# Patient Record
Sex: FEMALE | Race: WHITE | Employment: OTHER | URBAN - METROPOLITAN AREA
[De-identification: names, ages, dates, MRNs, and addresses within clinical notes are randomized per-mention and may not be internally consistent; named-entity substitution may affect disease eponyms.]

---

## 2022-08-06 ENCOUNTER — APPOINTMENT (OUTPATIENT)
Dept: GENERAL RADIOLOGY | Age: 62
End: 2022-08-06
Payer: COMMERCIAL

## 2022-08-06 ENCOUNTER — HOSPITAL ENCOUNTER (EMERGENCY)
Age: 62
Discharge: LEFT BEFORE DISCHARGE | End: 2022-08-06
Attending: STUDENT IN AN ORGANIZED HEALTH CARE EDUCATION/TRAINING PROGRAM
Payer: COMMERCIAL

## 2022-08-06 ENCOUNTER — APPOINTMENT (OUTPATIENT)
Dept: CT IMAGING | Age: 62
End: 2022-08-06
Payer: COMMERCIAL

## 2022-08-06 VITALS
RESPIRATION RATE: 20 BRPM | SYSTOLIC BLOOD PRESSURE: 140 MMHG | HEART RATE: 67 BPM | HEIGHT: 64 IN | BODY MASS INDEX: 27.31 KG/M2 | DIASTOLIC BLOOD PRESSURE: 83 MMHG | OXYGEN SATURATION: 97 % | WEIGHT: 160 LBS

## 2022-08-06 DIAGNOSIS — S42.002A CLOSED DISPLACED FRACTURE OF LEFT CLAVICLE, UNSPECIFIED PART OF CLAVICLE, INITIAL ENCOUNTER: Primary | ICD-10-CM

## 2022-08-06 LAB
ANION GAP SERPL CALCULATED.3IONS-SCNC: 10 MMOL/L (ref 4–16)
APTT: 28.3 SECONDS (ref 25.1–37.1)
BASOPHILS ABSOLUTE: 0.1 K/CU MM
BASOPHILS RELATIVE PERCENT: 0.7 % (ref 0–1)
BUN BLDV-MCNC: 20 MG/DL (ref 6–23)
CALCIUM SERPL-MCNC: 9.4 MG/DL (ref 8.3–10.6)
CHLORIDE BLD-SCNC: 107 MMOL/L (ref 99–110)
CO2: 23 MMOL/L (ref 21–32)
CREAT SERPL-MCNC: 0.8 MG/DL (ref 0.6–1.1)
DIFFERENTIAL TYPE: ABNORMAL
EOSINOPHILS ABSOLUTE: 0 K/CU MM
EOSINOPHILS RELATIVE PERCENT: 0.4 % (ref 0–3)
GFR AFRICAN AMERICAN: >60 ML/MIN/1.73M2
GFR NON-AFRICAN AMERICAN: >60 ML/MIN/1.73M2
GLUCOSE BLD-MCNC: 105 MG/DL (ref 70–99)
HCT VFR BLD CALC: 39 % (ref 37–47)
HEMOGLOBIN: 12.7 GM/DL (ref 12.5–16)
IMMATURE NEUTROPHIL %: 0.5 % (ref 0–0.43)
INR BLD: 0.92 INDEX
LYMPHOCYTES ABSOLUTE: 2.1 K/CU MM
LYMPHOCYTES RELATIVE PERCENT: 28.5 % (ref 24–44)
MCH RBC QN AUTO: 28.7 PG (ref 27–31)
MCHC RBC AUTO-ENTMCNC: 32.6 % (ref 32–36)
MCV RBC AUTO: 88 FL (ref 78–100)
MONOCYTES ABSOLUTE: 0.5 K/CU MM
MONOCYTES RELATIVE PERCENT: 7.1 % (ref 0–4)
NUCLEATED RBC %: 0 %
PDW BLD-RTO: 12.6 % (ref 11.7–14.9)
PLATELET # BLD: 239 K/CU MM (ref 140–440)
PMV BLD AUTO: 10 FL (ref 7.5–11.1)
POTASSIUM SERPL-SCNC: 4.4 MMOL/L (ref 3.5–5.1)
PROTHROMBIN TIME: 11.9 SECONDS (ref 11.7–14.5)
RBC # BLD: 4.43 M/CU MM (ref 4.2–5.4)
SEGMENTED NEUTROPHILS ABSOLUTE COUNT: 4.6 K/CU MM
SEGMENTED NEUTROPHILS RELATIVE PERCENT: 62.8 % (ref 36–66)
SODIUM BLD-SCNC: 140 MMOL/L (ref 135–145)
TOTAL IMMATURE NEUTOROPHIL: 0.04 K/CU MM
TOTAL NUCLEATED RBC: 0 K/CU MM
WBC # BLD: 7.3 K/CU MM (ref 4–10.5)

## 2022-08-06 PROCEDURE — 85025 COMPLETE CBC W/AUTO DIFF WBC: CPT

## 2022-08-06 PROCEDURE — 71045 X-RAY EXAM CHEST 1 VIEW: CPT

## 2022-08-06 PROCEDURE — 96374 THER/PROPH/DIAG INJ IV PUSH: CPT

## 2022-08-06 PROCEDURE — 85730 THROMBOPLASTIN TIME PARTIAL: CPT

## 2022-08-06 PROCEDURE — 85610 PROTHROMBIN TIME: CPT

## 2022-08-06 PROCEDURE — 72125 CT NECK SPINE W/O DYE: CPT

## 2022-08-06 PROCEDURE — 72170 X-RAY EXAM OF PELVIS: CPT

## 2022-08-06 PROCEDURE — 99285 EMERGENCY DEPT VISIT HI MDM: CPT

## 2022-08-06 PROCEDURE — 80048 BASIC METABOLIC PNL TOTAL CA: CPT

## 2022-08-06 PROCEDURE — 70450 CT HEAD/BRAIN W/O DYE: CPT

## 2022-08-06 PROCEDURE — 73030 X-RAY EXAM OF SHOULDER: CPT

## 2022-08-06 PROCEDURE — 6370000000 HC RX 637 (ALT 250 FOR IP): Performed by: STUDENT IN AN ORGANIZED HEALTH CARE EDUCATION/TRAINING PROGRAM

## 2022-08-06 PROCEDURE — 93005 ELECTROCARDIOGRAM TRACING: CPT | Performed by: STUDENT IN AN ORGANIZED HEALTH CARE EDUCATION/TRAINING PROGRAM

## 2022-08-06 PROCEDURE — 6360000002 HC RX W HCPCS: Performed by: STUDENT IN AN ORGANIZED HEALTH CARE EDUCATION/TRAINING PROGRAM

## 2022-08-06 PROCEDURE — 73564 X-RAY EXAM KNEE 4 OR MORE: CPT

## 2022-08-06 RX ORDER — MORPHINE SULFATE 4 MG/ML
4 INJECTION, SOLUTION INTRAMUSCULAR; INTRAVENOUS ONCE
Status: COMPLETED | OUTPATIENT
Start: 2022-08-06 | End: 2022-08-06

## 2022-08-06 RX ORDER — HYDROCODONE BITARTRATE AND ACETAMINOPHEN 5; 325 MG/1; MG/1
1 TABLET ORAL ONCE
Status: COMPLETED | OUTPATIENT
Start: 2022-08-06 | End: 2022-08-06

## 2022-08-06 RX ADMIN — HYDROCODONE BITARTRATE AND ACETAMINOPHEN 1 TABLET: 5; 325 TABLET ORAL at 16:58

## 2022-08-06 RX ADMIN — MORPHINE SULFATE 4 MG: 4 INJECTION, SOLUTION INTRAMUSCULAR; INTRAVENOUS at 14:11

## 2022-08-06 ASSESSMENT — PAIN - FUNCTIONAL ASSESSMENT: PAIN_FUNCTIONAL_ASSESSMENT: 0-10

## 2022-08-06 ASSESSMENT — PAIN SCALES - GENERAL
PAINLEVEL_OUTOF10: 10
PAINLEVEL_OUTOF10: 9
PAINLEVEL_OUTOF10: 8

## 2022-08-06 ASSESSMENT — PAIN DESCRIPTION - LOCATION: LOCATION: SHOULDER

## 2022-08-06 ASSESSMENT — PAIN DESCRIPTION - ORIENTATION
ORIENTATION: LEFT
ORIENTATION: LEFT

## 2022-08-06 NOTE — DISCHARGE INSTRUCTIONS
You are leaving AMA. Presents to the ED for further evaluation. You may also follow-up with the nearest ED as soon as possible for full evaluation.

## 2022-08-06 NOTE — ED PROVIDER NOTES
Emergency Department Encounter    Patient: Franca Cooper  MRN: [id number]  : 1960  Date of Evaluation: 2022  ED Provider:  Lubna Byrd DO    Triage Chief Complaint:   Fall (Off a horse and horse landed on her left arm)    [place]:  Franca Cooper is a 64 y.o. female that presents to the ED with a history of trauma. Patient fell off her horse on one of the left side of her body. Patient denies any loss of consciousness but endorses pain in the left shoulder. ROS - see HPI, below listed is current ROS at time of my eval:  General:  No fevers, no chills, no weakness  Eyes:  No recent vison changes, no discharge  ENT:  No sore throat, no nasal congestion, no hearing changes  Cardiovascular:  No chest pain, no palpitations  Respiratory:  No shortness of breath, no cough, no wheezing  Gastrointestinal:  No pain, no nausea, no vomiting, no diarrhea  Musculoskeletal:  No muscle pain, no joint pain  Skin:  No rash, no pruritis, no easy bruising  Neurologic:  No speech problems, no headache, no extremity numbness, no extremity tingling, no extremity weakness  Psychiatric:  No anxiety  Genitourinary:  No dysuria, no hematuria  Endocrine:  No unexpected weight gain, no unexpected weight loss  Extremities:  no edema, no pain    History reviewed. No pertinent past medical history. Past Surgical History:   Procedure Laterality Date     SECTION      x2    HERNIA REPAIR      TONSILLECTOMY       History reviewed. No pertinent family history.   Social History     Socioeconomic History    Marital status: Single     Spouse name: Not on file    Number of children: Not on file    Years of education: Not on file    Highest education level: Not on file   Occupational History    Not on file   Tobacco Use    Smoking status: Never     Passive exposure: Never    Smokeless tobacco: Never   Vaping Use    Vaping Use: Never used   Substance and Sexual Activity    Alcohol use: Not Currently    Drug use: Never    Sexual activity: Not Currently   Other Topics Concern    Not on file   Social History Narrative    Not on file     Social Determinants of Health     Financial Resource Strain: Not on file   Food Insecurity: Not on file   Transportation Needs: Not on file   Physical Activity: Not on file   Stress: Not on file   Social Connections: Not on file   Intimate Partner Violence: Not on file   Housing Stability: Not on file     No current facility-administered medications for this encounter. No current outpatient medications on file. Allergies   Allergen Reactions    Codeine Swelling       Nursing Notes Reviewed    Physical Exam:  Triage VS:    ED Triage Vitals   Enc Vitals Group      BP       Pulse       Resp       Temp       Temp src       SpO2       Weight       Height       Head Circumference       Peak Flow       Pain Score       Pain Loc       Pain Edu? Excl. in [address]? My pulse ox interpretation is - normal    General appearance:  No acute distress. Skin:  Warm. Dry. Eye:  Extraocular movements intact. Ears, nose, mouth and throat:  Oral mucosa moist   Neck:  Trachea midline. Extremity:  No swelling. Normal ROM     Heart:  Regular rate and rhythm, normal S1 & S2, no extra heart sounds. Perfusion:  intact  Respiratory:  Lungs clear to auscultation bilaterally. Respirations nonlabored. Abdominal:  Normal bowel sounds. Soft. Nontender. Non distended. Back:  No CVA tenderness to palpation     Neurological:  Alert and oriented times 3. No focal neuro deficits.              Psychiatric:  Appropriate    I have reviewed and interpreted all of the currently available lab results from this visit (if applicable):  Results for orders placed or performed during the hospital encounter of 08/06/22   CBC with Auto Differential   Result Value Ref Range    WBC 7.3 4.0 - 10.5 K/CU MM    RBC 4.43 4.2 - 5.4 M/CU MM    Hemoglobin 12.7 12.5 - 16.0 GM/DL    Hematocrit 39.0 37 - 47 %    MCV 88.0 78 - 100 FL MCH 28.7 27 - 31 PG    MCHC 32.6 32.0 - 36.0 %    RDW 12.6 11.7 - 14.9 %    Platelets 150 810 - 163 K/CU MM    MPV 10.0 7.5 - 11.1 FL    Differential Type AUTOMATED DIFFERENTIAL     Segs Relative 62.8 36 - 66 %    Lymphocytes % 28.5 24 - 44 %    Monocytes % 7.1 (H) 0 - 4 %    Eosinophils % 0.4 0 - 3 %    Basophils % 0.7 0 - 1 %    Segs Absolute 4.6 K/CU MM    Lymphocytes Absolute 2.1 K/CU MM    Monocytes Absolute 0.5 K/CU MM    Eosinophils Absolute 0.0 K/CU MM    Basophils Absolute 0.1 K/CU MM    Nucleated RBC % 0.0 %    Total Nucleated RBC 0.0 K/CU MM    Total Immature Neutrophil 0.04 K/CU MM    Immature Neutrophil % 0.5 (H) 0 - 0.43 %   Basic Metabolic Panel w/ Reflex to MG   Result Value Ref Range    Sodium 140 135 - 145 MMOL/L    Potassium 4.4 3.5 - 5.1 MMOL/L    Chloride 107 99 - 110 mMol/L    CO2 23 21 - 32 MMOL/L    Anion Gap 10 4 - 16    BUN 20 6 - 23 MG/DL    Creatinine 0.8 0.6 - 1.1 MG/DL    Glucose 105 (H) 70 - 99 MG/DL    Calcium 9.4 8.3 - 10.6 MG/DL    GFR Non-African American >60 >60 mL/min/1.73m2    GFR African American >60 >60 mL/min/1.73m2   Protime-INR   Result Value Ref Range    Protime 11.9 11.7 - 14.5 SECONDS    INR 0.92 INDEX   APTT   Result Value Ref Range    aPTT 28.3 25.1 - 37.1 SECONDS   EKG 12 Lead   Result Value Ref Range    Ventricular Rate 65 BPM    Atrial Rate 65 BPM    P-R Interval 168 ms    QRS Duration 84 ms    Q-T Interval 410 ms    QTc Calculation (Bazett) 426 ms    P Axis 67 degrees    R Axis 40 degrees    T Axis 21 degrees    Diagnosis       Normal sinus rhythm  Normal ECG  No previous ECGs available        Labs Reviewed   CBC WITH AUTO DIFFERENTIAL - Abnormal; Notable for the following components:       Result Value    Monocytes % 7.1 (*)     Immature Neutrophil % 0.5 (*)     All other components within normal limits   BASIC METABOLIC PANEL W/ REFLEX TO MG FOR LOW K - Abnormal; Notable for the following components:    Glucose 105 (*)     All other components within normal limits   PROTIME-INR   APTT       Radiographs (if obtained):  Radiologist's Report Reviewed:  CT Cervical Spine WO Contrast   Final Result   No acute intracranial abnormality. No acute abnormality of the cervical spine. CT Head WO Contrast   Final Result   No acute intracranial abnormality. No acute abnormality of the cervical spine. XR KNEE LEFT (MIN 4 VIEWS)   Final Result   No acute fracture or dislocation in the left knee. Mild tricompartmental   degenerative changes, most pronounced in the medial compartment. Mild   suprapatellar joint effusion. Age indeterminate avulsion fracture at the left inferior pubic ramus. No   acute fracture or dislocation in the remainder of the pelvis. Acute displaced comminuted fracture in the mid diaphysis of the left   clavicle. No acute fracture or dislocation in the remainder of the left   shoulder. XR PELVIS (1-2 VIEWS)   Final Result   No acute fracture or dislocation in the left knee. Mild tricompartmental   degenerative changes, most pronounced in the medial compartment. Mild   suprapatellar joint effusion. Age indeterminate avulsion fracture at the left inferior pubic ramus. No   acute fracture or dislocation in the remainder of the pelvis. Acute displaced comminuted fracture in the mid diaphysis of the left   clavicle. No acute fracture or dislocation in the remainder of the left   shoulder. XR SHOULDER LEFT (MIN 2 VIEWS)   Final Result   Acute comminuted displaced over riding fracture of the mid left clavicle. XR CHEST PORTABLE   Final Result   No acute cardiopulmonary disease. Acute, slightly over riding comminuted fracture mid left clavicle. If clinically there is concern of underlying rib fracture, sternal or other   acute traumatic abnormality of the chest, then additional evaluation with   dedicated imaging of the area of interest versus CT chest should be   considered. EKG (if obtained): (All EKG's are interpreted by myself in the absence of a cardiologist)      MDM:  Patient is a 51-year-old female who presented to the ED via EMS for evaluation of fall. Patient fell off a horse and landed on the left shoulder and complains of pain in the left shoulder and mild discomfort in the left thigh. Primary Survey  - Airway intact  - Bilateral breath sounds  - Bilateral pulses; radial/femoral/dorsalis pedis  - GCS 15, PERRLA    Secondary Survey   -Left shoulder pain  - Left shoulder discomfort   - Distal pulses intact. - Mild numbness in the left fifth finger    Patient is given pain medication and scheduled for labs and imaging. 3:30 PM  Patient has unremarkable CBC and electrolytes. Patient CT scan is unremarkable and patient's neck is cleared  CT scan of the head is also unremarkable. Pelvic x-ray is remarkable for avulsion fracture in the inferior pubic ramus  X-ray of the left shoulder and chest are significant for a displaced comminuted fracture of the left clavicle. Patient scheduled for transfer to [facility].    4:01 PM  Patient accepted at [facility].  Accepting doctor is Dr. Maryjane Barboza    4. 30Pm  [place]    Clinical Impression:  1. Closed displaced fracture of left clavicle, unspecified part of clavicle, initial encounter          Disposition referral (if applicable):  No follow-up provider specified. Disposition medications (if applicable):  New Prescriptions    No medications on file     ED Provider Disposition Time  DISPOSITION        Comment: Please note this report has been produced using speech recognition software and may contain errors related to that system including errors in grammar, punctuation, and spelling, as well as words and phrases that may be inappropriate. Efforts were made to edit the dictations.         [address] [facility],[address], DO  08/06/22 9279 [facility] [address] [place] [address], DO  08/06/22 [id number]

## 2022-08-08 LAB
EKG ATRIAL RATE: 65 BPM
EKG DIAGNOSIS: NORMAL
EKG P AXIS: 67 DEGREES
EKG P-R INTERVAL: 168 MS
EKG Q-T INTERVAL: 410 MS
EKG QRS DURATION: 84 MS
EKG QTC CALCULATION (BAZETT): 426 MS
EKG R AXIS: 40 DEGREES
EKG T AXIS: 21 DEGREES
EKG VENTRICULAR RATE: 65 BPM

## 2022-08-08 PROCEDURE — 93010 ELECTROCARDIOGRAM REPORT: CPT | Performed by: INTERNAL MEDICINE
